# Patient Record
Sex: MALE | Race: WHITE | NOT HISPANIC OR LATINO | Employment: STUDENT | URBAN - METROPOLITAN AREA
[De-identification: names, ages, dates, MRNs, and addresses within clinical notes are randomized per-mention and may not be internally consistent; named-entity substitution may affect disease eponyms.]

---
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
## 2025-01-16 ENCOUNTER — OFFICE VISIT (OUTPATIENT)
Dept: PEDIATRICS | Facility: CLINIC | Age: 20
End: 2025-01-16
Payer: COMMERCIAL

## 2025-01-16 VITALS
DIASTOLIC BLOOD PRESSURE: 81 MMHG | TEMPERATURE: 98.2 F | BODY MASS INDEX: 27.48 KG/M2 | HEIGHT: 75 IN | SYSTOLIC BLOOD PRESSURE: 117 MMHG | WEIGHT: 221 LBS | HEART RATE: 82 BPM

## 2025-01-16 DIAGNOSIS — Z20.828 EXPOSURE TO MONONUCLEOSIS SYNDROME: ICD-10-CM

## 2025-01-16 LAB — POC RAPID MONO: NEGATIVE

## 2025-01-16 PROCEDURE — 86308 HETEROPHILE ANTIBODY SCREEN: CPT | Performed by: PEDIATRICS

## 2025-01-16 PROCEDURE — 3008F BODY MASS INDEX DOCD: CPT | Performed by: PEDIATRICS

## 2025-01-16 PROCEDURE — 99213 OFFICE O/P EST LOW 20 MIN: CPT | Performed by: PEDIATRICS

## 2025-01-16 NOTE — PROGRESS NOTES
"Subjective   David Andrews a 19 y.o.malewho presents forExposure to Mononucleosis (Patient is 19 yrs old here because he has been exposed to mono- Girlfriend has mono so he wants to get tested for it)  HPI    He has no symptoms- wonder if he could have had recently.   No fevers, no other issues.      Objective   /81   Pulse 82   Temp 36.8 °C (98.2 °F) (Oral)   Ht 1.892 m (6' 2.5\")   Wt 100 kg (221 lb)   BMI 28.00 kg/m²       Physical Exam    General: Well-developed, well-nourished, alert and oriented, no acute distress  Eyes: Normal sclera, PERRLA, EOMI  ENT: Moist mucous membranes,  normal throat, no nasal discharge. TMs are normal.  Cardiac:  Normal S1/S2, no murmurs, regular rhythm. Capillary refill less than 2 seconds  Pulmonary: Clear to auscultation bilaterally, no work of breathing.  GI: normal abdomen without localization and without rebound or guarding.  Skin: No rashes  Neuro: Symmetric face, no ataxia, grossly normal strength.  Lymph: No lymphadenopathy          Office Visit on 01/16/2025   Component Date Value Ref Range Status    POC Rapid Mono 01/16/2025 Negative  Negative Final         Assessment/Plan   Diagnoses and all orders for this visit:  Exposure to mononucleosis syndrome  -     POCT Infectious mononucleosis antibody manually resulted  -     Mee-Barr Virus Antibody Panel; Future  Observe for now  "

## 2025-01-16 NOTE — PATIENT INSTRUCTIONS
Assessment/Plan   Diagnoses and all orders for this visit:  Exposure to mononucleosis syndrome  -     POCT Infectious mononucleosis antibody manually resulted  -     Mee-Barr Virus Antibody Panel; Future  Observe for now

## 2025-01-23 ENCOUNTER — TELEPHONE (OUTPATIENT)
Dept: PEDIATRICS | Facility: CLINIC | Age: 20
End: 2025-01-23
Payer: COMMERCIAL

## 2025-01-23 DIAGNOSIS — B34.9 VIRAL SYNDROME: Primary | ICD-10-CM

## 2025-01-23 RX ORDER — BROMPHENIRAMINE MALEATE, PSEUDOEPHEDRINE HYDROCHLORIDE, AND DEXTROMETHORPHAN HYDROBROMIDE 2; 30; 10 MG/5ML; MG/5ML; MG/5ML
10 SYRUP ORAL 4 TIMES DAILY PRN
Qty: 240 ML | Refills: 3 | Status: SHIPPED | OUTPATIENT
Start: 2025-01-23 | End: 2025-02-02

## 2025-01-23 NOTE — TELEPHONE ENCOUNTER
Mom was wondering if you could refill David's brompheniramine-pseudoeph-DM 2-30-10 mg/5 mL syrup to the listed pharmacy.   [id number] - [place], [place] - [address]  Phone: [phone]   Fax: [phone]

## 2025-04-20 ENCOUNTER — OFFICE VISIT (OUTPATIENT)
Dept: URGENT CARE | Age: 20
End: 2025-04-20
Payer: COMMERCIAL

## 2025-04-20 VITALS
OXYGEN SATURATION: 97 % | SYSTOLIC BLOOD PRESSURE: 127 MMHG | TEMPERATURE: 97.5 F | DIASTOLIC BLOOD PRESSURE: 70 MMHG | HEART RATE: 85 BPM | RESPIRATION RATE: 14 BRPM

## 2025-04-20 DIAGNOSIS — J03.90 TONSILLITIS: ICD-10-CM

## 2025-04-20 LAB
POC HUMAN RHINOVIRUS PCR: NEGATIVE
POC INFLUENZA A VIRUS PCR: NEGATIVE
POC INFLUENZA B VIRUS PCR: NEGATIVE
POC RAPID MONO: NEGATIVE
POC RESPIRATORY SYNCYTIAL VIRUS PCR: NEGATIVE
POC STREPTOCOCCUS PYOGENES (GROUP A STREP) PCR: NEGATIVE

## 2025-04-20 PROCEDURE — 87651 STREP A DNA AMP PROBE: CPT | Performed by: PHYSICIAN ASSISTANT

## 2025-04-20 PROCEDURE — 99214 OFFICE O/P EST MOD 30 MIN: CPT | Performed by: PHYSICIAN ASSISTANT

## 2025-04-20 PROCEDURE — 1036F TOBACCO NON-USER: CPT | Performed by: PHYSICIAN ASSISTANT

## 2025-04-20 PROCEDURE — 87631 RESP VIRUS 3-5 TARGETS: CPT | Performed by: PHYSICIAN ASSISTANT

## 2025-04-20 PROCEDURE — 86308 HETEROPHILE ANTIBODY SCREEN: CPT | Performed by: PHYSICIAN ASSISTANT

## 2025-04-20 ASSESSMENT — PATIENT HEALTH QUESTIONNAIRE - PHQ9
1. LITTLE INTEREST OR PLEASURE IN DOING THINGS: NOT AT ALL
SUM OF ALL RESPONSES TO PHQ9 QUESTIONS 1 AND 2: 0
2. FEELING DOWN, DEPRESSED OR HOPELESS: NOT AT ALL

## 2025-04-20 NOTE — LETTER
April 25, 2025     Patient: David Sarah   YOB: 2005   Date of Visit: 4/20/2025       To Whom It May Concern:    David Sarah was seen in my clinic on 4/20/2025 at 3:05 pm. Please excuse David for his absence from work on this day to make the appointment and for 4/21/25-4/24/25    If you have any questions or concerns, please don't hesitate to call.         Sincerely,         Tim Cerrato, APRN-CNP        CC: No Recipients

## 2025-04-20 NOTE — PROGRESS NOTES
Subjective   Patient ID: David Sarah is a 19 y.o. male. They present today with a chief complaint of body aches    Patient disposition: Home    HISTORY OF PRESENT ILLNESS:    This is an [employer] 19 year old presenting for body aches and flulike symptoms, malaise, fatigue, as well as mild sharp ST. Denies trouble swallowing, eating drinking. He had subjective fever last night. Denies CP, HA, SOB.    Past Medical History  Allergies as of 04/20/2025    (No Known Allergies)       Prescriptions Prior to Admission[1]     Medical History[2]    Surgical History[3]     reports that he has never smoked. He has never used smokeless tobacco. He reports that he does not drink alcohol and does not use drugs.    Review of Systems    Negative except as documented in the History of Present Illness.                             Objective    Vitals:    04/20/25 1508   BP: 127/70   Pulse: 85   Resp: 14   Temp: 36.4 °C (97.5 °F)   TempSrc: Oral   SpO2: 97%     No LMP for male patient.      PHYSICAL EXAMINATION:    CONSTITUTIONAL: well-appearing, nontoxic         ENT:  Head and face are unremarkable and atraumatic. Mucous membranes moist.    * Oropharynx with erythematous bl tonsillar pillars with white exudate. Airway patent.    * No uvular deviation. No visible abscess.    * Lymphadenopathy absent.    * TMs nl bl.         LUNGS:  CTAB, no r/r/w.    CARDIOVASCULAR:   RRR, no m/r/g. Nl S1/S2.    ABDOMEN:  Nontender including left upper quadrant, nondistended, no acute abdomen.     MUSCULOSKELETAL: No obvious deformities. AGGARWAL with equal strength. Gait normal.    SKIN:   Warm and dry with no rashes.    NEURO:  Normal baseline mental status.    PSYCH: Appropriate mood and affect.         ------------------------------------------         MDM: POCT strep, rhino, flu, RSV negative. Mono Spot also negative but sensitivity low at 24 hours of symptoms, and I notified the pt and his mom of this. He will seek retesting for Mono again if not  improving in 5-7 days. Advised to use Ibuprofen 800mg every 8 hours for sx. Declined testing for STI pharyngitis. Will fu either here or at PCP as needed.    The complexity of this visit was moderate because this was a new, previously undiagnosed medical problem and because of the testing ordered and interpreted by me on this visit.      Procedures    Diagnostic study results (if any) were reviewed by Ayo Guevara PA-C.    No results found for this visit on 04/20/25.     Assessment/Plan   Allergies, medications, history, and pertinent labs/EKGs/Imaging reviewed by Ayo Guevara PA-C.     Orders and Diagnoses  Diagnoses and all orders for this visit:  Sore throat  -     POCT SPOTFIRE R/ST Panel Mini w/Strep A [facility] manually resulted      Medical Admin Record      Follow Up Instructions  No follow-ups on file.    Electronically signed by Ayo Guevara PA-C  3:24 PM         [1] (Not in a hospital admission)   [2]   Past Medical History:  Diagnosis Date    Acute bronchospasm 11/28/2016    Bronchospasm, acute    Acute suppurative otitis media without spontaneous rupture of ear drum, unspecified ear 04/26/2014    Acute suppurative otitis media    Acute upper respiratory infection, unspecified 05/03/2016    URTI (acute upper respiratory infection)    Body mass index (BMI) pediatric, 5th percentile to less than 85th percentile for age 09/09/2020    BMI (body mass index), pediatric, 5% to less than 85% for age    Body mass index (BMI) pediatric, 85th percentile to less than 95th percentile for age 07/17/2019    Body mass index (BMI) 85th to less than 95th percentile with athletic build, pediatric    Cellulitis of neck 02/19/2018    Cellulitis and abscess of neck    Cough, unspecified 05/03/2016    Cough    Hordeolum externum left lower eyelid 11/29/2019    Hordeolum externum of left lower eyelid    Juvenile osteochondrosis of tarsus, 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    Sever's apophysitis    Otitis media,  unspecified, right ear 05/17/2021    Right otitis media    Otitis media, unspecified, right ear 03/19/2016    Acute otitis media, right    Personal history of other diseases of the respiratory system 03/31/2015    History of upper respiratory infection    Personal history of other diseases of the respiratory system 02/05/2019    History of pharyngitis    Personal history of other diseases of the respiratory system 08/18/2015    History of acute pharyngitis    Personal history of other diseases of the respiratory system 08/30/2019    History of acute pharyngitis    Personal history of other infectious and parasitic diseases 02/19/2018    History of molluscum contagiosum    Personal history of pneumonia (recurrent) 04/06/2015    History of pneumonia    Strain of muscle and tendon of front wall of thorax, initial encounter 12/20/2018    Muscle strain of anterior chest wall    Unspecified injury of neck, initial encounter 01/03/2019    Neck injury, initial encounter   [3] History reviewed. No pertinent surgical history.

## 2025-04-20 NOTE — PATIENT INSTRUCTIONS
Use IBUPROFEN 800MG every 8 hours as needed for symptoms and drink plenty of water. Follow up here as needed if symptoms are not improving. You may need to be retested for Mono if not improving in 5-7 days.  .  .  .  .  .  .  .

## 2025-04-20 NOTE — LETTER
April 21, 2025     Patient: David Sarah   YOB: 2005   Date of Visit: 4/20/2025       To Whom It May Concern:    David Sarah was seen in my clinic on 4/20/2025 at 3:05 pm. Please excuse David for his absence from work/school on this day to make the appointment. Please excuse David 04/21/25-04-22-25.     If you have any questions or concerns, please don't hesitate to call.         Sincerely,         Ayo Guevara PA-C        CC: No Recipients